# Patient Record
Sex: FEMALE
[De-identification: names, ages, dates, MRNs, and addresses within clinical notes are randomized per-mention and may not be internally consistent; named-entity substitution may affect disease eponyms.]

---

## 2017-06-10 ENCOUNTER — HEALTH MAINTENANCE LETTER (OUTPATIENT)
Age: 42
End: 2017-06-10

## 2018-01-04 ENCOUNTER — MEDICAL CORRESPONDENCE (OUTPATIENT)
Dept: HEALTH INFORMATION MANAGEMENT | Facility: CLINIC | Age: 43
End: 2018-01-04

## 2018-01-11 ENCOUNTER — OFFICE VISIT (OUTPATIENT)
Dept: ORTHOPEDICS | Facility: CLINIC | Age: 43
End: 2018-01-11
Payer: COMMERCIAL

## 2018-01-11 VITALS
HEART RATE: 76 BPM | DIASTOLIC BLOOD PRESSURE: 89 MMHG | SYSTOLIC BLOOD PRESSURE: 138 MMHG | HEIGHT: 64 IN | BODY MASS INDEX: 36.09 KG/M2 | WEIGHT: 211.4 LBS

## 2018-01-11 DIAGNOSIS — E65 CENTRAL OBESITY: ICD-10-CM

## 2018-01-11 DIAGNOSIS — M25.50 ARTHRALGIA, UNSPECIFIED JOINT: Primary | ICD-10-CM

## 2018-01-11 RX ORDER — HYDROCODONE BITARTRATE AND ACETAMINOPHEN 10; 325 MG/1; MG/1
1 TABLET ORAL EVERY 6 HOURS PRN
COMMUNITY

## 2018-01-11 NOTE — MR AVS SNAPSHOT
After Visit Summary   2018    Marjorie Sanches    MRN: [id number]           Patient Information     Date Of Birth          1975        Visit Information        Provider Department      2018 6:00 PM Paris Weeks MD [facility]        Today's Diagnoses     Arthralgia, unspecified joint    -  1    Central obesity           Follow-ups after your visit        Follow-up notes from your care team     Return if symptoms worsen or fail to improve.      Who to contact     Please call your clinic at [phone] to:    Ask questions about your health    Make or cancel appointments    Discuss your medicines    Learn about your test results    Speak to your doctor   If you have compliments or concerns about an experience at your clinic, or if you wish to file a complaint, please contact [facility] Patient Relations at [phone] or email us at [email or web address]         Additional Information About Your Visit        MyChart Information     [employer] is an electronic gateway that provides easy, online access to your medical records. With Meet You, you can request a clinic appointment, read your test results, renew a prescription or communicate with your care team.     To sign up for [employer] visit the website at [email or web address]   You will be asked to enter the access code listed below, as well as some personal information. Please follow the directions to create your username and password.     Your access code is: [email or web address]  Expires: 2018  3:22 PM     Your access code will  in 90 days. If you need help or a new code, please contact your [facility] or call [phone] for assistance.        Care EveryWhere ID     This is your Care EveryWhere ID. This could be used by other organizations to access your [facility] medical records  [id number]        Your Vitals Were     Pulse  "Height BMI (Body Mass Index)             76 5' 4\" (1.626 m) 36.29 kg/m2          Blood Pressure from Last 3 Encounters:   01/11/18 138/89    Weight from Last 3 Encounters:   01/11/18 211 lb 6.4 oz (95.9 kg)              Today, you had the following     No orders found for display       Primary Care Provider Office Phone # Fax #    Iraida Rogers [phone]       [facility] [address]  [place] [address]        Equal Access to Services     [facility]NA : Hadii aad ku hadasho Soomaali, waaxda luqadaha, qaybta kaalmada adeegyada, waxay idiin hayaan adewillian rain . So [facility] [phone].    ATENCIÓN: Si habla español, tiene a diaz disposición servicios gratuitos de asistencia lingüística. [facility] [phone].    We comply with applicable federal civil rights laws and [place] laws. We do not discriminate on the basis of race, color, national origin, age, disability, sex, sexual orientation, or gender identity.            Thank you!     Thank you for choosing [facility]  for your care. Our goal is always to provide you with excellent care. Hearing back from our patients is one way we can continue to improve our services. Please take a few minutes to complete the written survey that you may receive in the mail after your visit with us. Thank you!             Your Updated Medication List - Protect others around you: Learn how to safely use, store and throw away your medicines at www.disposemymeds.org.          This list is accurate as of: 1/11/18 11:59 PM.  Always use your most recent med list.                   Brand Name Dispense Instructions for use Diagnosis    AMBIEN CR PO      Take by mouth At Bedtime        AUGMENTIN PO           CELEXA PO      Take 40 mg by mouth daily        HYDROcodone-acetaminophen  MG per tablet    NORCO     Take 1 tablet by mouth every 6 hours as needed for moderate to severe pain (Takes 4 tablets per day)        MELATONIN PO "           MELOXICAM PO      Take by mouth daily        Morphine Sulfate ER 15 MG T12a           RITUXAN IV           VESICARE PO           VITAMIN D (CHOLECALCIFEROL) PO      Take by mouth daily        ZOFRAN PO      Take by mouth every 8 hours as needed for nausea or vomiting
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
